# Patient Record
Sex: FEMALE | URBAN - METROPOLITAN AREA
[De-identification: names, ages, dates, MRNs, and addresses within clinical notes are randomized per-mention and may not be internally consistent; named-entity substitution may affect disease eponyms.]

---

## 2022-01-03 ENCOUNTER — HOSPITAL ENCOUNTER (EMERGENCY)
Age: 47
Discharge: LEFT BEFORE DISCHARGE | End: 2022-01-03
Attending: EMERGENCY MEDICINE

## 2022-01-03 VITALS
OXYGEN SATURATION: 100 % | TEMPERATURE: 98.2 F | RESPIRATION RATE: 17 BRPM | SYSTOLIC BLOOD PRESSURE: 141 MMHG | HEIGHT: 62 IN | WEIGHT: 125.88 LBS | BODY MASS INDEX: 23.17 KG/M2 | DIASTOLIC BLOOD PRESSURE: 70 MMHG | HEART RATE: 84 BPM

## 2022-01-03 PROCEDURE — 75810000275 HC EMERGENCY DEPT VISIT NO LEVEL OF CARE

## 2022-01-03 NOTE — ED TRIAGE NOTES
Patient arrives via EMS d/t acute onset abd pain while having a BM during dialysis treatment  EMS reported patient DID NOT finish dialysis treatment; [address]

## 2022-01-03 NOTE — ED TRIAGE NOTES
Patient reports has happened before; patient reports Providers at [facility]    Patient reports pain not as severe as when at dialysis